# Patient Record
Sex: MALE | Race: WHITE | NOT HISPANIC OR LATINO | Employment: UNEMPLOYED | URBAN - METROPOLITAN AREA
[De-identification: names, ages, dates, MRNs, and addresses within clinical notes are randomized per-mention and may not be internally consistent; named-entity substitution may affect disease eponyms.]

---

## 2021-02-17 PROCEDURE — 91301 COVID-19, MRNA, LNP-S, PF, 100 MCG/0.5 ML DOSE VACCINE: ICD-10-PCS | Mod: S$GLB,,, | Performed by: FAMILY MEDICINE

## 2021-02-17 PROCEDURE — 0011A COVID-19, MRNA, LNP-S, PF, 100 MCG/0.5 ML DOSE VACCINE: ICD-10-PCS | Mod: CV19,S$GLB,, | Performed by: FAMILY MEDICINE

## 2021-02-17 PROCEDURE — 91301 COVID-19, MRNA, LNP-S, PF, 100 MCG/0.5 ML DOSE VACCINE: CPT | Mod: S$GLB,,, | Performed by: FAMILY MEDICINE

## 2021-02-17 PROCEDURE — 0011A COVID-19, MRNA, LNP-S, PF, 100 MCG/0.5 ML DOSE VACCINE: CPT | Mod: CV19,S$GLB,, | Performed by: FAMILY MEDICINE

## 2021-02-24 ENCOUNTER — IMMUNIZATION (OUTPATIENT)
Dept: FAMILY MEDICINE | Facility: CLINIC | Age: 31
End: 2021-02-24

## 2021-02-24 DIAGNOSIS — Z23 NEED FOR VACCINATION: Primary | ICD-10-CM

## 2021-03-24 ENCOUNTER — IMMUNIZATION (OUTPATIENT)
Dept: FAMILY MEDICINE | Facility: CLINIC | Age: 31
End: 2021-03-24
Payer: OTHER GOVERNMENT

## 2021-03-24 DIAGNOSIS — Z23 NEED FOR VACCINATION: Primary | ICD-10-CM

## 2021-03-24 PROCEDURE — 0012A COVID-19, MRNA, LNP-S, PF, 100 MCG/0.5 ML DOSE VACCINE: CPT | Mod: CV19,S$GLB,, | Performed by: FAMILY MEDICINE

## 2021-03-24 PROCEDURE — 0012A COVID-19, MRNA, LNP-S, PF, 100 MCG/0.5 ML DOSE VACCINE: ICD-10-PCS | Mod: CV19,S$GLB,, | Performed by: FAMILY MEDICINE

## 2021-03-24 PROCEDURE — 91301 COVID-19, MRNA, LNP-S, PF, 100 MCG/0.5 ML DOSE VACCINE: CPT | Mod: S$GLB,,, | Performed by: FAMILY MEDICINE

## 2021-03-24 PROCEDURE — 91301 COVID-19, MRNA, LNP-S, PF, 100 MCG/0.5 ML DOSE VACCINE: ICD-10-PCS | Mod: S$GLB,,, | Performed by: FAMILY MEDICINE

## 2023-08-04 ENCOUNTER — HOSPITAL ENCOUNTER (EMERGENCY)
Facility: HOSPITAL | Age: 33
Discharge: HOME | End: 2023-08-04
Attending: EMERGENCY MEDICINE

## 2023-08-04 VITALS
DIASTOLIC BLOOD PRESSURE: 92 MMHG | RESPIRATION RATE: 19 BRPM | TEMPERATURE: 98 F | HEART RATE: 73 BPM | HEIGHT: 67 IN | BODY MASS INDEX: 36.1 KG/M2 | SYSTOLIC BLOOD PRESSURE: 142 MMHG | WEIGHT: 230 LBS | OXYGEN SATURATION: 96 %

## 2023-08-04 DIAGNOSIS — I10 HYPERTENSION, UNSPECIFIED TYPE: ICD-10-CM

## 2023-08-04 DIAGNOSIS — R07.89 ATYPICAL CHEST PAIN: Primary | ICD-10-CM

## 2023-08-04 DIAGNOSIS — R74.01 TRANSAMINITIS: ICD-10-CM

## 2023-08-04 DIAGNOSIS — R79.89 ELEVATED LFTS: ICD-10-CM

## 2023-08-04 LAB
ALBUMIN SERPL BCP-MCNC: 4.5 G/DL (ref 3.5–5.2)
ALP SERPL-CCNC: 41 U/L (ref 55–135)
ALT SERPL W/O P-5'-P-CCNC: 162 U/L (ref 10–44)
ANION GAP SERPL CALC-SCNC: 9 MMOL/L (ref 8–16)
AST SERPL-CCNC: 166 U/L (ref 10–40)
BASOPHILS # BLD AUTO: 0.07 K/UL (ref 0–0.2)
BASOPHILS NFR BLD: 0.6 % (ref 0–1.9)
BILIRUB SERPL-MCNC: 1.4 MG/DL (ref 0.1–1)
BNP SERPL-MCNC: 8 PG/ML (ref 0–99)
BUN SERPL-MCNC: 13 MG/DL (ref 6–20)
CALCIUM SERPL-MCNC: 10 MG/DL (ref 8.7–10.5)
CHLORIDE SERPL-SCNC: 104 MMOL/L (ref 95–110)
CO2 SERPL-SCNC: 25 MMOL/L (ref 23–29)
CREAT SERPL-MCNC: 0.8 MG/DL (ref 0.5–1.4)
DIFFERENTIAL METHOD: ABNORMAL
EOSINOPHIL # BLD AUTO: 0.1 K/UL (ref 0–0.5)
EOSINOPHIL NFR BLD: 0.7 % (ref 0–8)
ERYTHROCYTE [DISTWIDTH] IN BLOOD BY AUTOMATED COUNT: 12.5 % (ref 11.5–14.5)
EST. GFR  (NO RACE VARIABLE): >60 ML/MIN/1.73 M^2
GLUCOSE SERPL-MCNC: 97 MG/DL (ref 70–110)
HCT VFR BLD AUTO: 48.9 % (ref 40–54)
HGB BLD-MCNC: 16.6 G/DL (ref 14–18)
IMM GRANULOCYTES # BLD AUTO: 0.06 K/UL (ref 0–0.04)
IMM GRANULOCYTES NFR BLD AUTO: 0.5 % (ref 0–0.5)
INR PPP: 1 (ref 0.8–1.2)
LYMPHOCYTES # BLD AUTO: 2 K/UL (ref 1–4.8)
LYMPHOCYTES NFR BLD: 18.1 % (ref 18–48)
MCH RBC QN AUTO: 29.6 PG (ref 27–31)
MCHC RBC AUTO-ENTMCNC: 33.9 G/DL (ref 32–36)
MCV RBC AUTO: 87 FL (ref 82–98)
MONOCYTES # BLD AUTO: 0.7 K/UL (ref 0.3–1)
MONOCYTES NFR BLD: 5.9 % (ref 4–15)
NEUTROPHILS # BLD AUTO: 8.1 K/UL (ref 1.8–7.7)
NEUTROPHILS NFR BLD: 74.2 % (ref 38–73)
NRBC BLD-RTO: 0 /100 WBC
PLATELET # BLD AUTO: 255 K/UL (ref 150–450)
PMV BLD AUTO: 11.3 FL (ref 9.2–12.9)
POTASSIUM SERPL-SCNC: 4.1 MMOL/L (ref 3.5–5.1)
PROT SERPL-MCNC: 8 G/DL (ref 6–8.4)
PROTHROMBIN TIME: 10.7 SEC (ref 9–12.5)
RBC # BLD AUTO: 5.6 M/UL (ref 4.6–6.2)
SODIUM SERPL-SCNC: 138 MMOL/L (ref 136–145)
TROPONIN I SERPL HS-MCNC: 15.8 PG/ML (ref 0–14.9)
TROPONIN I SERPL HS-MCNC: 16.2 PG/ML (ref 0–14.9)
WBC # BLD AUTO: 10.98 K/UL (ref 3.9–12.7)

## 2023-08-04 PROCEDURE — 80053 COMPREHEN METABOLIC PANEL: CPT | Performed by: NURSE PRACTITIONER

## 2023-08-04 PROCEDURE — 87340 HEPATITIS B SURFACE AG IA: CPT | Performed by: EMERGENCY MEDICINE

## 2023-08-04 PROCEDURE — 99285 EMERGENCY DEPT VISIT HI MDM: CPT | Mod: 25

## 2023-08-04 PROCEDURE — 93005 ELECTROCARDIOGRAM TRACING: CPT | Performed by: INTERNAL MEDICINE

## 2023-08-04 PROCEDURE — 83880 ASSAY OF NATRIURETIC PEPTIDE: CPT | Performed by: NURSE PRACTITIONER

## 2023-08-04 PROCEDURE — 93010 ELECTROCARDIOGRAM REPORT: CPT | Mod: ,,, | Performed by: INTERNAL MEDICINE

## 2023-08-04 PROCEDURE — 84484 ASSAY OF TROPONIN QUANT: CPT | Performed by: NURSE PRACTITIONER

## 2023-08-04 PROCEDURE — 93010 EKG 12-LEAD: ICD-10-PCS | Mod: ,,, | Performed by: INTERNAL MEDICINE

## 2023-08-04 PROCEDURE — 85610 PROTHROMBIN TIME: CPT | Performed by: NURSE PRACTITIONER

## 2023-08-04 PROCEDURE — 85025 COMPLETE CBC W/AUTO DIFF WBC: CPT | Performed by: NURSE PRACTITIONER

## 2023-08-04 RX ORDER — AMLODIPINE BESYLATE 5 MG/1
5 TABLET ORAL DAILY
Qty: 30 TABLET | Refills: 0 | Status: SHIPPED | OUTPATIENT
Start: 2023-08-04 | End: 2024-08-03

## 2023-08-04 NOTE — DISCHARGE INSTRUCTIONS
Follow up with the primary care physician by next week.  Start Norvasc 5 mg once daily for elevated blood pressure.  Monitor your blood pressure daily.  When you take your blood pressure, make sure it is in the same arm and in the same position each time.  Return to the ER if needed

## 2023-08-04 NOTE — ED PROVIDER NOTES
"Encounter Date: 8/4/2023       History     Chief Complaint   Patient presents with    Chest Pain     Pt states chest pain that started this morning      32-year-old male who is had a benign past medical history, presents emergency room accompanied by his parents with a history that he began having midsternal chest pain upon awakening that he describes as "indigestion as well as being tight and squeezing.  No known history of any coronary disease.  The patient denies any pleuritic component to his chest.  He is had no history of known cardiac disease.  No history any exercise intolerance.  The patient was noted to have been diaphoretic and hypertensive prior to coming in.  Patient denies any history of any nausea vomiting or shortness of breath.  He took 325 mg aspirin prior to coming in.  No coughing.  No wheezing.  Patient does not smoke or drink.  His family history is significant in that his father has heart disease, hypertension, diabetes, hyperlipidemia.      Review of patient's allergies indicates:   Allergen Reactions    Codeine      No past medical history on file.  No past surgical history on file.  No family history on file.     Review of Systems   Constitutional:  Positive for diaphoresis. Negative for activity change, chills and fever.   HENT:  Negative for congestion, rhinorrhea, sinus pain, sore throat and trouble swallowing.    Respiratory:  Positive for chest tightness. Negative for cough and shortness of breath.    Cardiovascular:  Positive for chest pain.   Gastrointestinal:  Negative for abdominal pain, nausea and vomiting.   Genitourinary:  Negative for difficulty urinating and hematuria.   Musculoskeletal:  Negative for back pain.   Skin:  Negative for color change and pallor.   Neurological:  Negative for headaches.   All other systems reviewed and are negative.      Physical Exam     Initial Vitals [08/04/23 0858]   BP Pulse Resp Temp SpO2   (!) 164/100 71 18 97.8 °F (36.6 °C) 99 %      MAP    "    --         Physical Exam    Vitals reviewed.  Constitutional: He appears well-developed and well-nourished. He is not diaphoretic. No distress.   HENT:   Head: Normocephalic and atraumatic.   Right Ear: External ear normal.   Left Ear: External ear normal.   Nose: Nose normal.   Mouth/Throat: Oropharynx is clear and moist.   Eyes: Conjunctivae and EOM are normal. Pupils are equal, round, and reactive to light.   Neck: Neck supple. No JVD present.   Normal range of motion.  Cardiovascular:  Normal rate, regular rhythm, normal heart sounds and intact distal pulses.     Exam reveals no gallop and no friction rub.       No murmur heard.  Pulmonary/Chest: Breath sounds normal. No respiratory distress. He has no wheezes. He has no rhonchi. He has no rales. He exhibits no tenderness.   Abdominal: Abdomen is soft. Bowel sounds are normal. He exhibits no distension. There is no abdominal tenderness. There is no rebound and no guarding.   Musculoskeletal:         General: No tenderness or edema. Normal range of motion.      Cervical back: Normal range of motion and neck supple.     Lymphadenopathy:     He has no cervical adenopathy.   Neurological: He is alert and oriented to person, place, and time. He has normal strength. GCS score is 15. GCS eye subscore is 4. GCS verbal subscore is 5. GCS motor subscore is 6.   Skin: Skin is warm and dry. Capillary refill takes less than 2 seconds. No rash noted. No erythema. No pallor.   Psychiatric: He has a normal mood and affect. His behavior is normal. Judgment and thought content normal.         ED Course   Procedures  Labs Reviewed   CBC W/ AUTO DIFFERENTIAL - Abnormal; Notable for the following components:       Result Value    Gran # (ANC) 8.1 (*)     Immature Grans (Abs) 0.06 (*)     Gran % 74.2 (*)     All other components within normal limits   COMPREHENSIVE METABOLIC PANEL - Abnormal; Notable for the following components:    Total Bilirubin 1.4 (*)     Alkaline Phosphatase  41 (*)      (*)      (*)     All other components within normal limits   TROPONIN I HIGH SENSITIVITY - Abnormal; Notable for the following components:    Troponin I High Sensitivity 15.8 (*)     All other components within normal limits   TROPONIN I HIGH SENSITIVITY - Abnormal; Notable for the following components:    Troponin I High Sensitivity 16.2 (*)     All other components within normal limits   B-TYPE NATRIURETIC PEPTIDE   PROTIME-INR   HEPATITIS PANEL, COMPREHENSIVE        ECG Results              EKG 12-lead (In process)  Result time 08/04/23 09:29:52      In process by Interface, Lab In [facility] (08/04/23 09:29:52)                   Narrative:    Test Reason : R07.9,    Vent. Rate : 069 BPM     Atrial Rate : 069 BPM     P-R Int : 154 ms          QRS Dur : 090 ms      QT Int : 374 ms       P-R-T Axes : 011 046 037 degrees     QTc Int : 400 ms    Normal sinus rhythm  Normal ECG  No previous ECGs available    Referred By: [email or web address]   SELF           Confirmed By:                                   Imaging Results              US Abdomen Limited (Final result)  Result time 08/04/23 14:31:19      Final result by Johny Frost MD (08/04/23 14:31:19)                   Narrative:    CLINICAL HISTORY:  32 years (1990) Male Elevated LFTs    TECHNIQUE:  US ABDOMEN LIMITED. Right upper quadrant ultrasound, images obtained in grayscale and color. Additional Doppler images of the portal vein were obtained.    COMPARISON:  None available.    FINDINGS:  Please note this examination is technically suboptimal due to patient related factors, including body habitus as well as overlying bowel gas.    The LIVER is enlarged in size measuring 18.9 cm (sagittal right lobe). Hepatic parenchyma has increased echogenicity with poor delineation of the periportal triads. No definite intrahepatic masses are noted. The portal vein is patent with hepatopetal flow.    The BILIARY SYSTEM is normal in caliber; the  common hepatic duct measures 6-7 mm. There are no stones seen in the GALLBLADDER, and there is no wall thickening, pericholecystic fluid or finding to suggest acute cholecystitis.    The PANCREATIC head and body are partially visualized but grossly normal in appearance. The pancreatic duct is not dilated.    The right KIDNEY is normal in size at 11.2 x 7.9 x 7.3 cm and echogenicity/texture. No stones or hydronephrosis seen. No solid masses are noted.    The AORTA and IVC are partially visualized and unremarkable.    IMPRESSION:  1. Hepatomegaly background parenchymal findings of steatosis.  2. No cholelithiasis or finding of acute cholecystitis.                .    Electronically signed by:  Johny Frost MD  8/4/2023 2:31 PM CDT Workstation: [id number]                                     X-Ray Chest AP Portable (Final result)  Result time 08/04/23 09:17:38      Final result by Alex Botello MD (08/04/23 09:17:38)                   Narrative:    Chest single view    Clinical data:Chest pain    FINDINGS: AP view of the chest demonstrates no cardiac, pulmonary, or acute osseous abnormalities. 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    IMPRESSION:  1. No acute process.  2. 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 Chronic ununited fracture is felt less likely.    Electronically signed by:  Alex Botello MD  8/4/2023 9:17 AM CDT Workstation: [id number]                                     Medications - No data to display             Attending Attestation:             Attending ED Notes:   This 34-year-old male who is had a benign past medical history, who presented with complaints of chest pain.  EKG showed a sinus rhythm with no evidence of any ischemia injury.  No ectopy.  The patient's chest x-ray is unremarkable.  Screening labs showed 2 sense of high sensitivity troponins similar with the  1st being 15.8 and a 2nd 16.2.  The patient admits that his chest pain lasted only 1 hour in his never recurred.  Screening labs obtained otherwise showed abnormal LFTs with a bilirubin of 1.4, AST of 166,  and alk-phos 41.  Patient had no complaints of abdominal pain or nausea and vomiting.  A hepatitis panel is ordered.  The patient also has had some mild elevation was blood pressure will be started on Norvasc 5 mg once daily.  He is to follow up with primary care for further evaluation and return to the ER if needed.    Differential diagnosis includes chest wall pain, GERD, NSTEMI, STEMI, pericarditis, biliary colic, hepatitis.                   Clinical Impression:   Final diagnoses:  [R07.89] Atypical chest pain (Primary)  [R79.89] Elevated LFTs  [I10] Hypertension, unspecified type  [R74.01] Transaminitis        ED Disposition Condition    Discharge Stable          ED Prescriptions       Medication Sig Dispense Start Date End Date Auth. Provider    amLODIPine (NORVASC) 5 MG tablet Take 1 tablet (5 mg total) by mouth once daily. 30 tablet 8/4/2023 8/3/2024 Juan Pablo Patiño Jr., MD          Follow-up Information       Follow up With Specialties Details Why Contact Info    Rica Villa MD Family Medicine Schedule an appointment as soon as possible for a visit   [address]  [place] [address] [phone]               Juan Pablo Patiño Jr., MD  08/04/23 3516

## 2023-08-07 LAB
HAV AB SER QL IA: NEGATIVE
HAV IGM SERPL QL IA: NEGATIVE
HBV CORE AB SERPL QL IA: NEGATIVE
HBV CORE IGM SERPL QL IA: NEGATIVE
HBV SURFACE AB SER QL: NON REACTIVE
HBV SURFACE AG SERPL QL IA: NEGATIVE
HCV AB S/CO SERPL IA: NON REACTIVE